# Patient Record
Sex: FEMALE | Race: WHITE | Employment: OTHER | URBAN - METROPOLITAN AREA
[De-identification: names, ages, dates, MRNs, and addresses within clinical notes are randomized per-mention and may not be internally consistent; named-entity substitution may affect disease eponyms.]

---

## 2017-04-13 ENCOUNTER — HOSPITAL ENCOUNTER (OUTPATIENT)
Age: 82
Discharge: HOME | End: 2017-04-13
Attending: EMERGENCY MEDICINE
Payer: MEDICARE

## 2017-04-13 VITALS
BODY MASS INDEX: 21.22 KG/M2 | HEIGHT: 68 IN | SYSTOLIC BLOOD PRESSURE: 135 MMHG | RESPIRATION RATE: 18 BRPM | DIASTOLIC BLOOD PRESSURE: 58 MMHG | HEART RATE: 77 BPM | TEMPERATURE: 99 F | WEIGHT: 140 LBS | OXYGEN SATURATION: 98 %

## 2017-04-13 DIAGNOSIS — J98.8 VIRAL RESPIRATORY ILLNESS: Primary | ICD-10-CM

## 2017-04-13 DIAGNOSIS — B97.89 VIRAL RESPIRATORY ILLNESS: Primary | ICD-10-CM

## 2017-04-13 PROCEDURE — 99214 OFFICE O/P EST MOD 30 MIN: CPT

## 2017-04-13 PROCEDURE — 94640 AIRWAY INHALATION TREATMENT: CPT

## 2017-04-13 RX ORDER — ALBUTEROL SULFATE 2.5 MG/3ML
2.5 SOLUTION RESPIRATORY (INHALATION) ONCE
Status: COMPLETED | OUTPATIENT
Start: 2017-04-13 | End: 2017-04-13

## 2017-04-13 RX ORDER — ALBUTEROL SULFATE 90 UG/1
2 AEROSOL, METERED RESPIRATORY (INHALATION) EVERY 4 HOURS PRN
Qty: 1 INHALER | Refills: 0 | Status: SHIPPED | OUTPATIENT
Start: 2017-04-13 | End: 2017-05-13

## 2017-04-13 NOTE — ED PROVIDER NOTES
Patient Seen in: [address] [facility]    History   Patient presents with:  Cough/URI    Stated Complaint: cough/congestion    HPI    Patient is an 40-year-old female who presents to immediate care complaining of 2 days of fever, ch Vitamin D deficiency 8/26/2015   • Skin lesion of right leg 12/15     Dr. Soraya Wolff removed, not ca           Past Surgical History    EXCIS LUMBAR DISK,ONE LEVEL  1969    Comment L4-5 HELEN WITH Cathy 4 CATARACT EXTRACAP,INSERT LENS week      Review of Systems   Constitutional: Positive for fever, chills and fatigue. HENT: Positive for congestion. Respiratory: Positive for cough. Cardiovascular: Negative for chest pain.    Gastrointestinal: Negative for abdominal pain and diarr - No data to display  Lungs clear bilaterally without any wheezing or rhonchi after nebulizer treatment.   Sats 99% on room air  MDM           Disposition and Plan     Clinical Impression:  Viral respiratory illness  (primary encounter diagnosis)    Disposi

## 2017-04-13 NOTE — ED INITIAL ASSESSMENT (HPI)
2 days of cough, headache. Now with body aches, low grade temp with chills and sweats. No N/V. Couple episodes of diarrhea yesterday. Denies chest pain or SOB.

## 2017-04-17 PROCEDURE — 84145 PROCALCITONIN (PCT): CPT | Performed by: INTERNAL MEDICINE

## 2017-09-19 PROBLEM — M17.0 BILATERAL PRIMARY OSTEOARTHRITIS OF KNEE: Status: ACTIVE | Noted: 2017-09-19

## 2017-09-28 PROBLEM — M17.0 PRIMARY OSTEOARTHRITIS OF BOTH KNEES: Status: ACTIVE | Noted: 2017-09-19

## 2017-09-28 PROCEDURE — 87086 URINE CULTURE/COLONY COUNT: CPT | Performed by: INTERNAL MEDICINE

## 2017-09-28 PROCEDURE — 81001 URINALYSIS AUTO W/SCOPE: CPT | Performed by: INTERNAL MEDICINE

## 2017-12-02 PROCEDURE — 86664 EPSTEIN-BARR NUCLEAR ANTIGEN: CPT | Performed by: INTERNAL MEDICINE

## 2017-12-02 PROCEDURE — 81001 URINALYSIS AUTO W/SCOPE: CPT | Performed by: INTERNAL MEDICINE

## 2017-12-02 PROCEDURE — 86665 EPSTEIN-BARR CAPSID VCA: CPT | Performed by: INTERNAL MEDICINE

## 2018-01-11 PROCEDURE — 81003 URINALYSIS AUTO W/O SCOPE: CPT | Performed by: INTERNAL MEDICINE

## 2018-02-14 PROBLEM — M81.0 AGE-RELATED OSTEOPOROSIS WITHOUT CURRENT PATHOLOGICAL FRACTURE: Status: ACTIVE | Noted: 2018-02-14

## 2018-04-16 PROCEDURE — 81003 URINALYSIS AUTO W/O SCOPE: CPT | Performed by: INTERNAL MEDICINE

## 2018-08-08 PROCEDURE — 81003 URINALYSIS AUTO W/O SCOPE: CPT | Performed by: INTERNAL MEDICINE

## (undated) NOTE — ED AVS SNAPSHOT
[facility] AND [facility] in [address] [facility]  [address]    Phone:  [phone]    Fax:  [phone] Malathi Ramos   MRN: [id number]    Department:  [facility] AND [facility] in [address] [place] [address]   Date of Visit: Discharge References/Attachments     INFLUENZA (ADULT) (ENGLISH)      Disclosure     Insurance plans vary and the physician(s) referred by the Immediate Care may not be covered by your plan.   It is possible that the physician may not participate in your he IF THERE IS ANY CHANGE OR WORSENING OF YOUR CONDITION, CALL YOUR PRIMARY CARE PHYSICIAN AT ONCE OR GO TO THE EMERGENCY DEPARTMENT.     If you have been prescribed any medication(s), please fill your prescription right away and begin taking the medication(s) you to explore options for quitting.     - If you have concerns related to behavioral health issues or thoughts of harming yourself, contact [address] [place] [address] at [phone].     - If you don’t have insurance, Neelam Cardenas